# Patient Record
Sex: MALE | Race: WHITE
[De-identification: names, ages, dates, MRNs, and addresses within clinical notes are randomized per-mention and may not be internally consistent; named-entity substitution may affect disease eponyms.]

---

## 2019-12-03 ENCOUNTER — HOSPITAL ENCOUNTER (OUTPATIENT)
Dept: HOSPITAL 95 - ER | Age: 79
Setting detail: OBSERVATION
LOS: 6 days | Discharge: HOSPICE | End: 2019-12-09
Attending: FAMILY MEDICINE | Admitting: HOSPITALIST
Payer: COMMERCIAL

## 2019-12-03 VITALS — WEIGHT: 198.64 LBS | BODY MASS INDEX: 30.1 KG/M2 | HEIGHT: 67.99 IN

## 2019-12-03 DIAGNOSIS — E66.9: ICD-10-CM

## 2019-12-03 DIAGNOSIS — Z91.011: ICD-10-CM

## 2019-12-03 DIAGNOSIS — Z87.891: ICD-10-CM

## 2019-12-03 DIAGNOSIS — Z79.84: ICD-10-CM

## 2019-12-03 DIAGNOSIS — Z90.49: ICD-10-CM

## 2019-12-03 DIAGNOSIS — J44.9: ICD-10-CM

## 2019-12-03 DIAGNOSIS — Z88.1: ICD-10-CM

## 2019-12-03 DIAGNOSIS — K21.9: ICD-10-CM

## 2019-12-03 DIAGNOSIS — E11.9: ICD-10-CM

## 2019-12-03 DIAGNOSIS — K70.31: Primary | ICD-10-CM

## 2019-12-03 DIAGNOSIS — Z79.899: ICD-10-CM

## 2019-12-03 DIAGNOSIS — C22.7: ICD-10-CM

## 2019-12-03 LAB
ALBUMIN SERPL BCP-MCNC: 2.1 G/DL (ref 3.4–5)
ALBUMIN/GLOB SERPL: 0.4 {RATIO} (ref 0.8–1.8)
ALT SERPL W P-5'-P-CCNC: 71 U/L (ref 12–78)
ANION GAP SERPL CALCULATED.4IONS-SCNC: 8 MMOL/L (ref 6–16)
AST SERPL W P-5'-P-CCNC: 151 U/L (ref 12–37)
BASOPHILS # BLD AUTO: 0.04 K/MM3 (ref 0–0.23)
BASOPHILS NFR BLD AUTO: 0 % (ref 0–2)
BILIRUB SERPL-MCNC: 3.1 MG/DL (ref 0.1–1)
BUN SERPL-MCNC: 53 MG/DL (ref 8–24)
CALCIUM SERPL-MCNC: 10 MG/DL (ref 8.5–10.1)
CHLORIDE SERPL-SCNC: 101 MMOL/L (ref 98–108)
CO2 SERPL-SCNC: 25 MMOL/L (ref 21–32)
CREAT SERPL-MCNC: 1.51 MG/DL (ref 0.6–1.2)
DEPRECATED RDW RBC AUTO: 60.5 FL (ref 35.1–46.3)
EOSINOPHIL # BLD AUTO: 0.11 K/MM3 (ref 0–0.68)
EOSINOPHIL NFR BLD AUTO: 1 % (ref 0–6)
ERYTHROCYTE [DISTWIDTH] IN BLOOD BY AUTOMATED COUNT: 17.6 % (ref 11.7–14.2)
GLOBULIN SER CALC-MCNC: 5.1 G/DL (ref 2.2–4)
GLUCOSE SERPL-MCNC: 138 MG/DL (ref 70–99)
HCT VFR BLD AUTO: 41.8 % (ref 37–53)
HGB BLD-MCNC: 14.5 G/DL (ref 13.5–17.5)
IMM GRANULOCYTES # BLD AUTO: 0.07 K/MM3 (ref 0–0.1)
IMM GRANULOCYTES NFR BLD AUTO: 1 % (ref 0–1)
LYMPHOCYTES # BLD AUTO: 1.24 K/MM3 (ref 0.84–5.2)
LYMPHOCYTES NFR BLD AUTO: 11 % (ref 21–46)
MCHC RBC AUTO-ENTMCNC: 34.7 G/DL (ref 31.5–36.5)
MCV RBC AUTO: 95 FL (ref 80–100)
MONOCYTES # BLD AUTO: 1.54 K/MM3 (ref 0.16–1.47)
MONOCYTES NFR BLD AUTO: 14 % (ref 4–13)
NEUTROPHILS # BLD AUTO: 7.92 K/MM3 (ref 1.96–9.15)
NEUTROPHILS NFR BLD AUTO: 73 % (ref 41–73)
NRBC # BLD AUTO: 0 K/MM3 (ref 0–0.02)
NRBC BLD AUTO-RTO: 0 /100 WBC (ref 0–0.2)
PLATELET # BLD AUTO: 231 K/MM3 (ref 150–400)
POTASSIUM SERPL-SCNC: 4.1 MMOL/L (ref 3.5–5.5)
PROT SERPL-MCNC: 7.2 G/DL (ref 6.4–8.2)
PROTHROMBIN TIME: 13.6 SEC (ref 9.7–11.5)
SODIUM SERPL-SCNC: 134 MMOL/L (ref 136–145)

## 2019-12-03 PROCEDURE — P9046 ALBUMIN (HUMAN), 25%, 20 ML: HCPCS

## 2019-12-03 PROCEDURE — C1729 CATH, DRAINAGE: HCPCS

## 2019-12-03 PROCEDURE — G0378 HOSPITAL OBSERVATION PER HR: HCPCS

## 2019-12-03 PROCEDURE — 0W9F30Z DRAINAGE OF ABDOMINAL WALL WITH DRAINAGE DEVICE, PERCUTANEOUS APPROACH: ICD-10-PCS | Performed by: SURGERY

## 2019-12-04 LAB
ALBUMIN FLD-MCNC: 0.6 G/DL
ALBUMIN SERPL BCP-MCNC: 1.8 G/DL (ref 3.4–5)
ALBUMIN/GLOB SERPL: 0.4 {RATIO} (ref 0.8–1.8)
ALT SERPL W P-5'-P-CCNC: 67 U/L (ref 12–78)
ANION GAP SERPL CALCULATED.4IONS-SCNC: 9 MMOL/L (ref 6–16)
AST SERPL W P-5'-P-CCNC: 144 U/L (ref 12–37)
BASOPHILS # BLD AUTO: 0.02 K/MM3 (ref 0–0.23)
BASOPHILS NFR BLD AUTO: 0 % (ref 0–2)
BILIRUB SERPL-MCNC: 3 MG/DL (ref 0.1–1)
BUN SERPL-MCNC: 47 MG/DL (ref 8–24)
CALCIUM SERPL-MCNC: 9.4 MG/DL (ref 8.5–10.1)
CHLORIDE SERPL-SCNC: 102 MMOL/L (ref 98–108)
CO2 SERPL-SCNC: 23 MMOL/L (ref 21–32)
CREAT SERPL-MCNC: 1.19 MG/DL (ref 0.6–1.2)
DEPRECATED RDW RBC AUTO: 59.6 FL (ref 35.1–46.3)
EOSINOPHIL # BLD AUTO: 0.07 K/MM3 (ref 0–0.68)
EOSINOPHIL NFR BLD AUTO: 1 % (ref 0–6)
ERYTHROCYTE [DISTWIDTH] IN BLOOD BY AUTOMATED COUNT: 17.6 % (ref 11.7–14.2)
GLOBULIN SER CALC-MCNC: 4.8 G/DL (ref 2.2–4)
GLUCOSE FLD-MCNC: 154 MG/DL
GLUCOSE SERPL-MCNC: 140 MG/DL (ref 70–99)
HCT VFR BLD AUTO: 38.1 % (ref 37–53)
HGB BLD-MCNC: 13 G/DL (ref 13.5–17.5)
IMM GRANULOCYTES # BLD AUTO: 0.08 K/MM3 (ref 0–0.1)
IMM GRANULOCYTES NFR BLD AUTO: 1 % (ref 0–1)
LDH SPEC-CCNC: 31 U/L
LYMPHOCYTES # BLD AUTO: 1.07 K/MM3 (ref 0.84–5.2)
LYMPHOCYTES NFR BLD AUTO: 11 % (ref 21–46)
LYMPHOCYTES NFR FLD MANUAL: 16 % (ref 0–18)
MCHC RBC AUTO-ENTMCNC: 34.1 G/DL (ref 31.5–36.5)
MCV RBC AUTO: 94 FL (ref 80–100)
MONOCYTES # BLD AUTO: 1.56 K/MM3 (ref 0.16–1.47)
MONOCYTES NFR BLD AUTO: 15 % (ref 4–13)
MONONUC CELLS NFR FLD MANUAL: 34 % (ref 0–50)
NEUTROPHILS # BLD AUTO: 7.4 K/MM3 (ref 1.96–9.15)
NEUTROPHILS NFR BLD AUTO: 73 % (ref 41–73)
NEUTS SEG NFR FLD MANUAL: 50 % (ref 0–25)
NRBC # BLD AUTO: 0 K/MM3 (ref 0–0.02)
NRBC BLD AUTO-RTO: 0 /100 WBC (ref 0–0.2)
PLATELET # BLD AUTO: 219 K/MM3 (ref 150–400)
POTASSIUM SERPL-SCNC: 4.8 MMOL/L (ref 3.5–5.5)
PROT SERPL-MCNC: 6.6 G/DL (ref 6.4–8.2)
RBC # FLD MANUAL: 428 /MM3 (ref 0–0)
SODIUM SERPL-SCNC: 134 MMOL/L (ref 136–145)
TOTAL CELLS COUNTED SPEC: 100
WBC # FLD AUTO: 0.28 K/MM3 (ref 0–999)

## 2019-12-04 NOTE — NUR
78 yr old male admitted earlier in the shift with ascites and liver cancer.
Answered admit questions with wife, then wife left. Alert and oriented x 4. Up
with assist. Scheduled paracentesis in the AM. Has received analgesic x 2 for
right shoulder pain - voiced arthritic s/s. IVF infusing as ordered. Uses call
light appropriately. Call light in reach.

## 2019-12-04 NOTE — NUR
pT HAS BEEN ASSISTED TO THE BATHROOM SEVERAL TIMSE THIS SHIFT TO VOID. hE
CONTINUES TO REFUSE TO USE URINAL FOR OUTPUT AMTS. - CALL LIGHT IN REACH

## 2019-12-04 NOTE — NUR
PT ALERT AND ORIENTED IN THE ROOM THIS SHIFT. PT SLOW TO RESPOND TO QUESTIONS
AT TIMES AND DRIFTS OFF. PT'S WIFE AND WIFE'S SISTER WERE IN THE ROOM FOR MUCH
OF THE SHIFT. PT UP TO THE BATHROOM WITH MINIMAL ASSIST, USING FWW MULTIPLE
TIMES THIS SHIFT. PT HAD A PARACENTESIS AROUND 2 PM, WITH 2.8 L REMOVED. PT
SEEMS TO BE BREATHING EASIER AFTER PARACENTESIS AND RESTING IN BED. PT
MEDICATED FOR PAIN IN RT SHOULDER THIS AM, DENIES FURTHER PAIN THIS AFTERNOON.
PT CURRENTLY RESTING IN BED.

## 2019-12-05 LAB
ALBUMIN SERPL BCP-MCNC: 1.9 G/DL (ref 3.4–5)
ALBUMIN/GLOB SERPL: 0.4 {RATIO} (ref 0.8–1.8)
ALT SERPL W P-5'-P-CCNC: 75 U/L (ref 12–78)
ANION GAP SERPL CALCULATED.4IONS-SCNC: 8 MMOL/L (ref 6–16)
AST SERPL W P-5'-P-CCNC: 164 U/L (ref 12–37)
BASOPHILS # BLD AUTO: 0.04 K/MM3 (ref 0–0.23)
BASOPHILS NFR BLD AUTO: 0 % (ref 0–2)
BILIRUB SERPL-MCNC: 3.4 MG/DL (ref 0.1–1)
BUN SERPL-MCNC: 36 MG/DL (ref 8–24)
CALCIUM SERPL-MCNC: 9.6 MG/DL (ref 8.5–10.1)
CHLORIDE SERPL-SCNC: 103 MMOL/L (ref 98–108)
CO2 SERPL-SCNC: 26 MMOL/L (ref 21–32)
CREAT SERPL-MCNC: 0.86 MG/DL (ref 0.6–1.2)
CREAT UR-MCNC: 76.6 MG/DL (ref 27–270)
DEPRECATED RDW RBC AUTO: 63.7 FL (ref 35.1–46.3)
EOSINOPHIL # BLD AUTO: 0.08 K/MM3 (ref 0–0.68)
EOSINOPHIL NFR BLD AUTO: 1 % (ref 0–6)
ERYTHROCYTE [DISTWIDTH] IN BLOOD BY AUTOMATED COUNT: 18.3 % (ref 11.7–14.2)
GLOBULIN SER CALC-MCNC: 4.8 G/DL (ref 2.2–4)
GLUCOSE SERPL-MCNC: 150 MG/DL (ref 70–99)
HCT VFR BLD AUTO: 41.1 % (ref 37–53)
HGB BLD-MCNC: 13.7 G/DL (ref 13.5–17.5)
IMM GRANULOCYTES # BLD AUTO: 0.07 K/MM3 (ref 0–0.1)
IMM GRANULOCYTES NFR BLD AUTO: 1 % (ref 0–1)
KETONES UR STRIP-MCNC: (no result) MG/DL
LEUKOCYTE ESTERASE UR QL STRIP: (no result)
LYMPHOCYTES # BLD AUTO: 1.05 K/MM3 (ref 0.84–5.2)
LYMPHOCYTES NFR BLD AUTO: 10 % (ref 21–46)
MCHC RBC AUTO-ENTMCNC: 33.3 G/DL (ref 31.5–36.5)
MCV RBC AUTO: 96 FL (ref 80–100)
MONOCYTES # BLD AUTO: 1.43 K/MM3 (ref 0.16–1.47)
MONOCYTES NFR BLD AUTO: 14 % (ref 4–13)
NEUTROPHILS # BLD AUTO: 7.52 K/MM3 (ref 1.96–9.15)
NEUTROPHILS NFR BLD AUTO: 74 % (ref 41–73)
NRBC # BLD AUTO: 0 K/MM3 (ref 0–0.02)
NRBC BLD AUTO-RTO: 0 /100 WBC (ref 0–0.2)
OSMOLALITY UR: 514 MOS/KG (ref 15–1400)
PLATELET # BLD AUTO: 228 K/MM3 (ref 150–400)
POTASSIUM SERPL-SCNC: 4 MMOL/L (ref 3.5–5.5)
PROT SERPL-MCNC: 6.7 G/DL (ref 6.4–8.2)
PROT UR STRIP-MCNC: (no result) MG/DL
RBC #/AREA URNS HPF: (no result) /HPF (ref 0–2)
SODIUM SERPL-SCNC: 137 MMOL/L (ref 136–145)
SODIUM UR-SCNC: 9 MMOL/L (ref 20–110)
SP GR SPEC: 1.02 (ref 1–1.02)
UROBILINOGEN UR STRIP-MCNC: (no result) MG/DL
UUN UR-MCNC: 1068 MG/DL (ref 350–1000)

## 2019-12-05 NOTE — NUR
2050 DR MICHAUD AT SIDE; PTS WIFE--NUBIA AT SIDE; ABD FIRM AND DISTENDED; ABLE
FOLLOW VERY SIMPLE VERBAL COMMANDS; BED ALARM APPLIED WITH BED LOW POSITION
AND CALL LIGHT AT SIDE; WIFE SPENDING NIGHT AT SIDE.

## 2019-12-05 NOTE — NUR
SPOKE TO DR NGUYỄN-  HAD MENTIONED POSSIBLE GI CONSULT, NO CONSULT CALLED BY
RN. PER  HE SPOKE TO GI DR ALREADY.

## 2019-12-05 NOTE — NUR
SHIFT SUMMARY-
PT A&O X2 AT THE START OF THE SHIFT. TRAMADOL GIVEN FOR PAIN THIS MORNING,
THEN DR CAME TO SEE THE PT, TRAMADOL DC'D AND 5MG OXY ORDERED. LATER IN THE
SHIFT (SEE EMAR FOR TIME) PT WAS GIVEN OXY FOR PAIN, PT WAS GRIMACING AND
GROANING AND QUICK TO REPLY YES TO OFFERS OF PAIN MEDICATION. PT STATED PAIN
IN HIS RIGHT ARM AND SHOULDER AS WELL AS HIS BACK. ON FOLLOW UP TO PAIN
MEDICATION PT WAS SLEEPING SITTING UP IN THE CHAIR. WHEN PT WAS SHAKEN HE
WOULD WAKE FOR ONE WORD AND RETURN TO SLUMBER. ONCE PT WAS RETURNED TO THE BED
HE CONTINUED TO SLEEP FOR HOURS WAKING FOR A SENTENCE OR MORE AT AROUND 1800.
DR AWARE OF PT SLEEPYNESS. PAIN MED DOSE AND FREQUENCY CHANGED.
 
WHIL PT WAS SLEEPING HIS O2 SATS DECREASED TO 84% PLACED ON 2L O2 SATS 92%.

## 2019-12-05 NOTE — NUR
SHIFT SUMMARY:
77 Y/O MALE HAD RESTLESS SHIFT AT TIMES WITH PATIENT UP VOID AND REPOSITIONED
NUMEROUS TIMES; ALERT AND ORIENTED X 2, REQUIRES FREQUENT FOLLOW UP AND
REDIRECTIONS WITH ALL TASKS; URINE OBTAINED AND SENT TO LAB; BED ALARM
APPLIED, BED LOW POSITION WITH CALL LIGHT AT SIDE.

## 2019-12-06 LAB
ALBUMIN SERPL BCP-MCNC: 1.7 G/DL (ref 3.4–5)
ALBUMIN/GLOB SERPL: 0.4 {RATIO} (ref 0.8–1.8)
ALT SERPL W P-5'-P-CCNC: 74 U/L (ref 12–78)
ANION GAP SERPL CALCULATED.4IONS-SCNC: 7 MMOL/L (ref 6–16)
AST SERPL W P-5'-P-CCNC: 144 U/L (ref 12–37)
BASOPHILS # BLD AUTO: 0.07 K/MM3 (ref 0–0.23)
BASOPHILS NFR BLD AUTO: 1 % (ref 0–2)
BILIRUB SERPL-MCNC: 4 MG/DL (ref 0.1–1)
BUN SERPL-MCNC: 28 MG/DL (ref 8–24)
CALCIUM SERPL-MCNC: 9.4 MG/DL (ref 8.5–10.1)
CHLORIDE SERPL-SCNC: 102 MMOL/L (ref 98–108)
CO2 SERPL-SCNC: 27 MMOL/L (ref 21–32)
CREAT SERPL-MCNC: 0.85 MG/DL (ref 0.6–1.2)
DEPRECATED RDW RBC AUTO: 70.1 FL (ref 35.1–46.3)
EOSINOPHIL # BLD AUTO: 0.1 K/MM3 (ref 0–0.68)
EOSINOPHIL NFR BLD AUTO: 1 % (ref 0–6)
ERYTHROCYTE [DISTWIDTH] IN BLOOD BY AUTOMATED COUNT: 18.7 % (ref 11.7–14.2)
GLOBULIN SER CALC-MCNC: 4.5 G/DL (ref 2.2–4)
GLUCOSE SERPL-MCNC: 137 MG/DL (ref 70–99)
HCT VFR BLD AUTO: 43.6 % (ref 37–53)
HGB BLD-MCNC: 14.1 G/DL (ref 13.5–17.5)
IMM GRANULOCYTES # BLD AUTO: 0.21 K/MM3 (ref 0–0.1)
IMM GRANULOCYTES NFR BLD AUTO: 2 % (ref 0–1)
LYMPHOCYTES # BLD AUTO: 1.15 K/MM3 (ref 0.84–5.2)
LYMPHOCYTES NFR BLD AUTO: 9 % (ref 21–46)
MCHC RBC AUTO-ENTMCNC: 32.3 G/DL (ref 31.5–36.5)
MCV RBC AUTO: 101 FL (ref 80–100)
MONOCYTES # BLD AUTO: 1.24 K/MM3 (ref 0.16–1.47)
MONOCYTES NFR BLD AUTO: 9 % (ref 4–13)
NEUTROPHILS # BLD AUTO: 10.81 K/MM3 (ref 1.96–9.15)
NEUTROPHILS NFR BLD AUTO: 80 % (ref 41–73)
NRBC # BLD AUTO: 0 K/MM3 (ref 0–0.02)
NRBC BLD AUTO-RTO: 0 /100 WBC (ref 0–0.2)
PLATELET # BLD AUTO: 234 K/MM3 (ref 150–400)
POTASSIUM SERPL-SCNC: 4.4 MMOL/L (ref 3.5–5.5)
PROT SERPL-MCNC: 6.2 G/DL (ref 6.4–8.2)
SODIUM SERPL-SCNC: 136 MMOL/L (ref 136–145)

## 2019-12-06 NOTE — NUR
Initial Visit:
 
Pt is resting comfortably and snoring softly at time of visit.
 
Wife, Razia, is present. She is tired and anxious about his care. She reports
that he has had a steep decline and watching it has been difficult. She has
gone through his times of confusion and weakness. She has spoken to the
doctors and reports that she has been told that his best course of action is
hospice care. She reports that he is still wanting to pursue treatment in
[place], even though he is aware that it is not being successful. She states
that her cousin and her sister both lost their husbands to liver disease and
they have told her that the disease is incredibly challenging to caregivers at
end of life stage. She is anticipating this to be very difficult. She states
that he has already lost control of bowel and bladder at home and she worked
hard to help him transfer and clean him up.
She is aware of hospice services. Reviewed this service and what it would look
like at home. RN visits, bath aid, equipment. She voices her understanding.
She is waiting for a call from his doctor in [place] for further direction.
Will remain available for pt and family. He is apparently doing better today,
as pain meds sedated him yesterday. He has ambulated to the bathroom and back
to bed. He is eating today.
Updated nurse, Odalis. Pt had a paracentesis two days ago. Pt may need a PleurX
drain placement eventually to go on hospice. Advance directive is copied and
faxed to medical records. No other concerns.

## 2019-12-06 NOTE — NUR
SHIFT SUMMARY:
77 Y/O MALE HAD RESTLESS NIGHT AT TIMES; DR MICHAUD CONSULTED LAST PM WITH MD TO
READ OVER CHART AND RETURN FRIDAY; WIFE AT SIDE AND SUPPORTIVE; ALERT AND
ORIENTED X 2; PT SLEPT LOUNGE CHAIR LAST 4 HOURS OF SHIFT (STOOD AND BEARED
WEIGHT WHILE PIVOTING TO CHAIR VIA WALKER AND TWO ASSIST); PT VERY WEAK; ABD
FIRM AND DISTENDED; VOIDING YELLOW FLUID; ALERT AND ORIENTED X 2, ABLE TO
FOLLOW SIMPLE VERBAL COMMANDS; C/O RIGHT SHOULDER DISCOMFORT RATED 7/10 WITH
ROXICODONE 2.5MG PO GIVEN WITH RELIF FELT (ICE PACK ALSO APPLIED FOR COMFORT);
BED ALARM APPLIED, BED LOW POSITION WITH CALL LIGHT AT SIDE.

## 2019-12-06 NOTE — NUR
SHIFT SUMMARY-
PT DAUGHTER ARRIVED TODAY SHE AND PT SPOUSE DISCUSSED THE POSSIBILITY OF
HOSPICE, BOTH STATED THAT IS WHAT THE PT SHOULD DO BUT THEY AGREED THAT THE PT
WOULD NOT "GO FOR THAT, AT ALL." PT MORE ALERT TODAY THAN PREVIOUS. ABD
DISTENTION STILL PRESENT. PT STILL HAS BACK, RIGHT SHOULDER AND ARM PAIN. PT
CURRENTLY UP IN THE CHAIR. PT HAS BECOME A LARGER FALL RISK WITH THE INCREASED
ALERTNESS, BED AND CHAIR ALARMS SHOULD BE USED AT ALL TIMES. PT VERBILIZES
HOWEVER VERY SLOW TO RESPOND. PT SEEMS TO HAVE DIFFICULTY UNDERSTANDING WHAT
PEOPLE ARE SAYING. PT STATED THIS EVENING WHEN STAFF WERE HELPING WITH CUTTING
UP HIS DINNER "I DON'T MEAN TO OFFEND; I'M NOT GOING TO PLAY YOUR GAMES." PT
HAD BEEN OFFERED IV PAIN MEDICATION TO WHICH HE REPLIED "I WOULD NOT LIKE
THAT."
PT IS DECLINING D/T THE CHRONIC ISSUES, PLAN IS TO CONTINUE WITH ABX AND SEE
IF PT CAN IMPROVE COGNITIVELY. PT ADVANCED DIRECTIVE IS IN THE FRONT OF THE
CHART. PT IS CURRENTLY A DNR

## 2019-12-07 LAB
ALBUMIN SERPL BCP-MCNC: 1.8 G/DL (ref 3.4–5)
ALBUMIN/GLOB SERPL: 0.4 {RATIO} (ref 0.8–1.8)
ALT SERPL W P-5'-P-CCNC: 76 U/L (ref 12–78)
ANION GAP SERPL CALCULATED.4IONS-SCNC: 7 MMOL/L (ref 6–16)
AST SERPL W P-5'-P-CCNC: 168 U/L (ref 12–37)
BASOPHILS # BLD AUTO: 0.05 K/MM3 (ref 0–0.23)
BASOPHILS NFR BLD AUTO: 0 % (ref 0–2)
BILIRUB SERPL-MCNC: 4.6 MG/DL (ref 0.1–1)
BUN SERPL-MCNC: 31 MG/DL (ref 8–24)
CALCIUM SERPL-MCNC: 9.3 MG/DL (ref 8.5–10.1)
CHLORIDE SERPL-SCNC: 102 MMOL/L (ref 98–108)
CO2 SERPL-SCNC: 25 MMOL/L (ref 21–32)
CREAT SERPL-MCNC: 0.75 MG/DL (ref 0.6–1.2)
DEPRECATED RDW RBC AUTO: 60 FL (ref 35.1–46.3)
EOSINOPHIL # BLD AUTO: 0.09 K/MM3 (ref 0–0.68)
EOSINOPHIL NFR BLD AUTO: 1 % (ref 0–6)
ERYTHROCYTE [DISTWIDTH] IN BLOOD BY AUTOMATED COUNT: 17.8 % (ref 11.7–14.2)
GLOBULIN SER CALC-MCNC: 4.7 G/DL (ref 2.2–4)
GLUCOSE SERPL-MCNC: 161 MG/DL (ref 70–99)
HCT VFR BLD AUTO: 40.1 % (ref 37–53)
HGB BLD-MCNC: 13.8 G/DL (ref 13.5–17.5)
IMM GRANULOCYTES # BLD AUTO: 0.08 K/MM3 (ref 0–0.1)
IMM GRANULOCYTES NFR BLD AUTO: 1 % (ref 0–1)
LYMPHOCYTES # BLD AUTO: 0.96 K/MM3 (ref 0.84–5.2)
LYMPHOCYTES NFR BLD AUTO: 7 % (ref 21–46)
MCHC RBC AUTO-ENTMCNC: 34.4 G/DL (ref 31.5–36.5)
MCV RBC AUTO: 94 FL (ref 80–100)
MONOCYTES # BLD AUTO: 0.95 K/MM3 (ref 0.16–1.47)
MONOCYTES NFR BLD AUTO: 7 % (ref 4–13)
NEUTROPHILS # BLD AUTO: 11.59 K/MM3 (ref 1.96–9.15)
NEUTROPHILS NFR BLD AUTO: 84 % (ref 41–73)
NRBC # BLD AUTO: 0 K/MM3 (ref 0–0.02)
NRBC BLD AUTO-RTO: 0 /100 WBC (ref 0–0.2)
PLATELET # BLD AUTO: 261 K/MM3 (ref 150–400)
POTASSIUM SERPL-SCNC: 4.4 MMOL/L (ref 3.5–5.5)
PROT SERPL-MCNC: 6.5 G/DL (ref 6.4–8.2)
PROTHROMBIN TIME: 17.5 SEC (ref 9.7–11.5)
SODIUM SERPL-SCNC: 134 MMOL/L (ref 136–145)

## 2019-12-07 NOTE — NUR
Case Conference:
 
Received call from bedside RN Chantell reporting that family would like to
discuss goals of care with Palliative Care.
 
Arrived to Pt's room and family requests to conference outside of room.
Escorted Pt's wife Razia and Pt's daughter Julissa to Palliative Care office.
Listened as Razia reports Pt wanting to do home and Razia wanting Pt to come
home. Razia reports seeing a signifant decline in Pt and wants to focus on
quality of life. Dr Larson arrives and joins conversation. Dr Larson discusses
Pt's prognosis and is agreeable with Pt going home on hospice. Also discussed
PleurX drain. Dr Larson reports Pt would benefit from PleurX drain for comfort.
Family aware of hospice philosophy from previous Palliative Care nurse
education. Discuss further needs for equipment and hiring caregiver to assist
family with Pt care needs. Gave family hospice brochures to decide on agency.
Family would like Pt placed on comfort care. Educated family on comfort care
philosophy with V/U made. No other concerns reported at this time.
 
Called and spoke with Dr Lugo and discussed family's wishes. Dr Lugo will
place comfort care orders. Requested surgical consult for PleurX drain for
comfort. Dr Lugo not agreeable for PleurX drain for comfort.
 
Pt will need hospital bed, over bed table, bedside commode, oxygen, pressure
pump and pad delivered to home before Pt discharge. Will place hospice
referral.
 
Palliative Care will remain available.

## 2019-12-07 NOTE — NUR
SHIFT SUMMARY
 
PATIENT CONFUSED AND IMPULSIVE THROUGHOUT NIGHT SHIFT. PATIENT REPEATEDLY
SETTING BED AND CHAIR ALARM OFF. PATIENT SPOKE TO HIS WIFE AND WAS ABLE TO
SLEEP A COUPLE OF HOURS. PATIENT UP WITH MIN ASSIST TO BSC/BATHROOM. PATIENT
REPEATEDLY REMOVING TELEMETRY BOX AND THEN EVENTUALLY REFUSED TO ALLOW STAFF
TO REPLACE BACK ON HIM. SPOKE TO PCU MONITOR TECH AND MADE THEM AWARE PATIENT
WAS REFUSING. PCU MONITOR TECH RODOLFO STATED THAT PATIENT DID QUALIFY FOR DC OF
TELEMETRY BUT WILL DEFER THAT TO ATTENDING PHYSICIAN WHEN THEY ARE TAKING CALL
IN AM. BED ALARM AND CHAIR ALARM IN [place]. PATIENT ORIENTED TO SELF AND
FAMILY. WILL CONTINUE TO MONITOR.

## 2019-12-07 NOTE — NUR
PATIENT WAS DISORIENT THIS MORNING. THROUGHOUT THE DAY, HE BECAME MORE LUCID.
FAMILY WAS AT THE BEDSIDE MOST OF THE DAY. PATIENT SLEPT IN BED ON AND OFF
THROUGHOUT THE DAY. HE IS CURRENTLY SLEEPING IN BED. CMOPLAINTS OF PAIN IN HIS
RIGHT SHOULDER, TREATED BY REPOSITIONING. PALLIATIVE CARE SPOKE WITH THE
FAMILY. COMFORT CARE WAS DECIDED. THE PATIENT IS NPO IN PREPARATION FOR HIS
PROCEDURE TOMORROW. BED ALARM IS ON. WILL CONTINUE TO MONITOR.

## 2019-12-08 NOTE — NUR
SHIFT SUMMARY
 
PATIENT NAPPING MOST OF SHIFT. PATIENT HAD PLEUR-X DRAIN PLACED TODAY. PATIENT
UP TO BSC ONE ASSIST. FAMILY AT BEDSIDE. PATIETN MEDICATED X 1 FOR PAIN THIS
AFTERNOON. PALLIATIVE CARE WORKING WITH FAMILY TO ARRANGE HOSPICE DISCHARGE TO
HOME.

## 2019-12-08 NOTE — NUR
SHIFT SUMMARY
 
PT WAS UP FOR A GOOD HALF OF THE SHIFT, PT WAS ANXIOUS AND ATTEMPTED TO CLIMB
OOB SEVERAL TIMES WITHOUT ASSISTANCE. PT IS FORGETFUL AND DIFFICULT TO DIRECT.
PT REPORTS BACK AND SHOULDER PAIN. PRIMARILY SHOULDER PAIN. MEDICATED WITH
ROXICODONE WITHOUT MUCH RELIEF. PT CONTINUED TO REPORT PAIN AND WAS FINDING IT
VERY DIFFICULT TO LAY IN BED AND REST. I CONTINUED TO OFFER PT ADDITIONAL PAIN
RELIEF, AND HE REFUSED, BUT AFTER SOMETIME HE DECIDED THAT HE WANTED SOMETHING
ADDITIONAL FOR PAIN. MEDICATED WITH ROXICODONE WITH EFFECT. PT HAS RESTED
COMFORTABLY SINCE THAT TIME.  PT HAS BEEN AMBULATING WITH 1 PA TO THE [facility].
PLAN IS FOR PLEURX DRAIN PLACEMENT TODAY. PT HAS BEEN NPO. NO VISITORS THIS
SHIFT. COMFORT CARE CONTINUED PER ORDERS. COMFORT ASSESSED T/O SHIFT. BED IN
LOWEST POSITION, CALL LIGHT WITHIN REACH. WILL CONTINUE TO MONITOR AND REPORT
TO ONCOMING RN.

## 2019-12-08 NOTE — NUR
Pt visit this evening. Pt resting in bed and just received Roxanol for pain.
Pt's wife Razia and daughter Julissa at bedside. Offered to educate on PleurX
drain with training kit and video. Family requests to just watch video for now
and would like further training with kit tomorrow. Loaded training video for
family and answered questions. Family expresses appreciation and are opened
for continued training. Answered family questions outside Pt's room related to
S/S of transition phase and acitvely dying stage the Pt may experience. Obtain
copy of completed POLST and faxed to medical records.
 
Spoke with bedside RN Oksana and discussed case.
 
Palliative Care will remain available for symptom management and continued
education for PleurX drain. Family is also requesting a wheel chair to be
supplied by [facility].

## 2019-12-09 NOTE — NUR
PT DISCHARGED.
PT DISCHARGED AT 1616. PT DENIED PAIN PRIOR TO DC. PT & WIFE EDUCATED ON DC
INSTRUCTIONS & NEW MEDS. PALLITIVE SANDRITA TAO, REVIEWED PLEREX VAC WITH PT &
WIFE. PT WHEELED OUT BY AIDE & TO BE DRIVEN HOME BY WIFE. NO CHANGES IN
ASSESSMENT PRIOR TO DC.

## 2019-12-09 NOTE — NUR
PleurX Drain Teaching:
 
Pt is resting comfortably, respirations even and unlabored. Does not appear to
be in pain or distress.
 
Coaching for PleurX Drain system. Pt's wife will be the pt's primary
caregiver. She is able and willing to receive teaching to drain the pt's
abdominal fluid from the PleurX site. Teaching performed using the training
kit. Instructed on bandage removal, glove changes, sterile technique. Razia has
watched the video and states that she will do this again if needed.
Demonstration of draining the system and changing the dressing. She return
demonstrates, asking questions and engaged with task. She successfully drains
the training system and verbalizes understanding of completing this. Disposal
of bottle and supplies reviewed.
 
Razia is going home with PleurX Drain Kit, extra sterile gloves, adhesive
removal pads, 4X4 gauze, skin barrier pads, and 2 extra tegaderm films.
Instructed on these supplies and to ask [facility] hosp[facility] to provide her with
supplies as she needs them. She demonstrates good understanding of care. Card
given for palliative care if needs additional training with the system. No
other concerns from Razia at this time. Updated nursing on the instructions,
and to charge the kit to the pt with charge sheet.

## 2019-12-09 NOTE — NUR
SHIFT SUMMARY
 
PT HAS HAD A MUCH MORE RESTFUL NIGHT THIS SHIFT, AND HAS BEEN MORE A/O AND
DIRECTABLE. PT HAS NOT BEEN JUMPING OOB BED AS MUCH, HOWEVER HE STILL DOES NOT
USE CALL LIGHT WHEN HE NEEDS ASSISTANCE TO THE BATHROOM. PT STEADY ON FEET AND
AMBULATES TO THE BATHROOM WITH 1 PA. PT HAS NOT COMPLAINED OF ANY PAIN THIS
SHIFT, AND APPEARS MUCH MORE COMFORTABLE SINCE  PLEURX DRAIN PLACEMENT
YESTERDAY. PLEURX DRAIN SITE TO RLQ IS WNL. PT DECLINES TO BE REPOSITIONED, HE
ALSO REFUSES ORAL CARE. COMFORT ASSESSED T/O SHIFT. NO ACUTE CHANGES. PLAN IS
FOR [facility] TODAY. BED IN LOWEST POSITION, CALL LIGHT WITHIN REACH.
WILL CONTINUE TO MONITOR AND REPORT TO ONCOMING RN.